# Patient Record
Sex: FEMALE | URBAN - METROPOLITAN AREA
[De-identification: names, ages, dates, MRNs, and addresses within clinical notes are randomized per-mention and may not be internally consistent; named-entity substitution may affect disease eponyms.]

---

## 2019-12-19 ENCOUNTER — APPOINTMENT (OUTPATIENT)
Age: 64
Setting detail: DERMATOLOGY
End: 2020-01-04

## 2019-12-19 PROBLEM — E86.9 VOLUME DEPLETION, UNSPECIFIED: Status: ACTIVE | Noted: 2019-12-19

## 2019-12-19 PROCEDURE — OTHER ADDITIONAL NOTES: OTHER

## 2019-12-19 PROCEDURE — OTHER JUVEDERM VOLUMA XC INJECTION: OTHER

## 2019-12-19 NOTE — PROCEDURE: JUVEDERM VOLUMA XC INJECTION
Additional Area 5 Volume In Cc: 0
Detail Level: Simple
Consent: Written consent obtained. Risks include but not limited to bruising, beading, irregular texture, ulceration, infection, allergic reaction, scar formation, incomplete augmentation, temporary nature, procedural pain.
Procedural Text: The filler was administered to the treatment areas noted above with total 2 syringes of Voluma XC
Include Cannula Size?: 25G
Post-Care Instructions: Patient instructed to apply ice to reduce swelling.
Include Cannula Information In Note?: Yes
Number Of Syringes (Required For Inventory): 2
Use Map Statement For Sites (Optional): No
Filler: Juvederm Voluma XC
Price (Use Numbers Only, No Special Characters Or $): 720.0
Include Cannula Length?: 1 inch
Map Statment: See Attach Map for Complete Details

## 2020-02-18 ENCOUNTER — APPOINTMENT (OUTPATIENT)
Age: 65
Setting detail: DERMATOLOGY
End: 2020-02-20

## 2020-02-18 DIAGNOSIS — L81.4 OTHER MELANIN HYPERPIGMENTATION: ICD-10-CM

## 2020-02-18 DIAGNOSIS — I83.9 ASYMPTOMATIC VARICOSE VEINS OF LOWER EXTREMITIES: ICD-10-CM

## 2020-02-18 PROBLEM — I83.93 ASYMPTOMATIC VARICOSE VEINS OF BILATERAL LOWER EXTREMITIES: Status: ACTIVE | Noted: 2020-02-18

## 2020-02-18 PROCEDURE — OTHER LASER COSMETIC: OTHER

## 2020-02-18 PROCEDURE — OTHER PRODUCT LINE (REVISION): OTHER

## 2020-02-18 ASSESSMENT — LOCATION SIMPLE DESCRIPTION DERM
LOCATION SIMPLE: LEFT CHEEK
LOCATION SIMPLE: LEFT LIP
LOCATION SIMPLE: NOSE
LOCATION SIMPLE: RIGHT CHEEK
LOCATION SIMPLE: SUPERIOR FOREHEAD
LOCATION SIMPLE: RIGHT LIP
LOCATION SIMPLE: CHIN

## 2020-02-18 ASSESSMENT — LOCATION DETAILED DESCRIPTION DERM
LOCATION DETAILED: LEFT UPPER CUTANEOUS LIP
LOCATION DETAILED: NASAL DORSUM
LOCATION DETAILED: RIGHT LATERAL BUCCAL CHEEK
LOCATION DETAILED: RIGHT INFERIOR CENTRAL MALAR CHEEK
LOCATION DETAILED: SUPERIOR MID FOREHEAD
LOCATION DETAILED: LEFT CHIN
LOCATION DETAILED: LEFT CENTRAL MALAR CHEEK
LOCATION DETAILED: LEFT INFERIOR CENTRAL MALAR CHEEK
LOCATION DETAILED: RIGHT UPPER CUTANEOUS LIP

## 2020-02-18 ASSESSMENT — LOCATION ZONE DERM
LOCATION ZONE: LIP
LOCATION ZONE: NOSE
LOCATION ZONE: FACE

## 2020-02-18 NOTE — PROCEDURE: LASER COSMETIC
Detail Level: Simple
Pulse Duration (Include Units): 40 msec.
Indication: Rosacea
Wavelength (Include Units): 595
Render Post-Care In The Note: No
Treatment Number: 1/2
Number Of Passes: 2
Pre-Procedure Care: Prior to the procedure the patient and all present had protective eyewear in place and a warning sign was placed on the door.
End-Point And Post-Procedure Care: The procedure continued until mild purpura was noted.  Immediately following the procedure,TAC cream applied. Post care reviewed with patient.
Spotsize (Include Units): 12 mm
Post-Care Instructions: I reviewed with the patient in detail post-care instructions. Patient should stay away from the sun and wear sun protection until treated areas are fully healed.
Laser Type: Vbeam
Price (Use Numbers Only, No Special Characters Or $): 525.0
Anesthesia Volume In Cc: 0
Eye Protection: Laser Aid
Fluence (Include Units): 7
Consent: Prior to the procedure consent obtained, risks reviewed including but not limited to crusting, scabbing, blistering, scarring, darker or lighter pigmentary change, incidental hair removal, bruising, and/or incomplete removal.

## 2020-02-18 NOTE — PROCEDURE: PRODUCT LINE (REVISION)
Product 10 Units: 0
Product 21 Price (In Dollars - Numeric Only, No Special Characters Or $): 0.00
Name Of Product 2: Intellishade Matte
Render Product Pricing In Note: Yes
Allow Plan To Count Towards E/M Coding: No (this will remove associated impression from E/M calculation)
Product 2 Price (In Dollars - Numeric Only, No Special Characters Or $): 65.85
Send Charges To Patient Encounter: No
Detail Level: Zone
Name Of Product 1: Vit C++
Name Of Product 3: Nectifirm
Product 1 Price (In Dollars - Numeric Only, No Special Characters Or $): 140.48

## 2020-03-12 ENCOUNTER — APPOINTMENT (OUTPATIENT)
Age: 65
Setting detail: DERMATOLOGY
End: 2020-03-17

## 2020-03-12 DIAGNOSIS — L90.8 OTHER ATROPHIC DISORDERS OF SKIN: ICD-10-CM

## 2020-03-12 PROCEDURE — OTHER BOTOX: OTHER

## 2020-03-12 PROCEDURE — OTHER ADDITIONAL NOTES: OTHER

## 2020-03-12 ASSESSMENT — LOCATION DETAILED DESCRIPTION DERM
LOCATION DETAILED: LEFT INFERIOR TEMPLE
LOCATION DETAILED: RIGHT MID TEMPLE
LOCATION DETAILED: RIGHT INFERIOR FOREHEAD
LOCATION DETAILED: GLABELLA
LOCATION DETAILED: LEFT LATERAL FOREHEAD
LOCATION DETAILED: RIGHT INFERIOR MEDIAL FOREHEAD
LOCATION DETAILED: LEFT INFERIOR MEDIAL FOREHEAD
LOCATION DETAILED: RIGHT LATERAL EYEBROW

## 2020-03-12 ASSESSMENT — LOCATION SIMPLE DESCRIPTION DERM
LOCATION SIMPLE: LEFT TEMPLE
LOCATION SIMPLE: LEFT FOREHEAD
LOCATION SIMPLE: GLABELLA
LOCATION SIMPLE: RIGHT EYEBROW
LOCATION SIMPLE: RIGHT TEMPLE
LOCATION SIMPLE: RIGHT FOREHEAD

## 2020-03-12 ASSESSMENT — LOCATION ZONE DERM: LOCATION ZONE: FACE

## 2020-03-12 NOTE — PROCEDURE: BOTOX
Consent: Written consent obtained. Risks include but not limited to lid/brow ptosis, bruising, swelling, diplopia, temporary effect, incomplete chemical denervation. Psych/Behavioral

## 2021-03-31 ENCOUNTER — APPOINTMENT (OUTPATIENT)
Dept: URBAN - METROPOLITAN AREA CLINIC 272 | Age: 66
Setting detail: DERMATOLOGY
End: 2021-03-31

## 2021-03-31 DIAGNOSIS — L90.8 OTHER ATROPHIC DISORDERS OF SKIN: ICD-10-CM

## 2021-03-31 PROCEDURE — OTHER ADDITIONAL NOTES: OTHER

## 2021-03-31 PROCEDURE — OTHER BOTOX: OTHER

## 2021-03-31 ASSESSMENT — LOCATION DETAILED DESCRIPTION DERM
LOCATION DETAILED: LEFT MID TEMPLE
LOCATION DETAILED: LEFT CENTRAL EYEBROW
LOCATION DETAILED: RIGHT CENTRAL EYEBROW
LOCATION DETAILED: GLABELLA
LOCATION DETAILED: RIGHT MEDIAL EYEBROW
LOCATION DETAILED: RIGHT MID TEMPLE
LOCATION DETAILED: RIGHT LATERAL EYEBROW

## 2021-03-31 ASSESSMENT — LOCATION SIMPLE DESCRIPTION DERM
LOCATION SIMPLE: RIGHT EYEBROW
LOCATION SIMPLE: GLABELLA
LOCATION SIMPLE: RIGHT TEMPLE
LOCATION SIMPLE: LEFT EYEBROW
LOCATION SIMPLE: LEFT TEMPLE

## 2021-03-31 ASSESSMENT — LOCATION ZONE DERM: LOCATION ZONE: FACE

## 2021-03-31 NOTE — PROCEDURE: ADDITIONAL NOTES
Render Risk Assessment In Note?: no
Additional Notes: 6 units on right periorbital 4 on left side
Detail Level: Simple

## 2021-04-14 ENCOUNTER — APPOINTMENT (OUTPATIENT)
Dept: URBAN - METROPOLITAN AREA CLINIC 272 | Age: 66
Setting detail: DERMATOLOGY
End: 2021-04-14

## 2021-04-14 DIAGNOSIS — Z41.9 ENCOUNTER FOR PROCEDURE FOR PURPOSES OTHER THAN REMEDYING HEALTH STATE, UNSPECIFIED: ICD-10-CM

## 2021-04-14 PROCEDURE — OTHER FILLERS: OTHER

## 2021-04-14 PROCEDURE — OTHER ADDITIONAL NOTES: OTHER

## 2021-04-14 NOTE — PROCEDURE: FILLERS
Price (Use Numbers Only, No Special Characters Or $): 6529 Price (Use Numbers Only, No Special Characters Or $): 2870

## 2021-04-14 NOTE — PROCEDURE: ADDITIONAL NOTES
Additional Notes: Patient had 3 visible bruises, all zafar-able with brisk capillary refill.  Patient given ice and happy with results.  Bruising was expected,  pt made aware of signs and symptoms of occlusion and to notify office or provider if any occur.
Render Risk Assessment In Note?: no
Detail Level: Simple

## 2024-02-15 ENCOUNTER — APPOINTMENT (OUTPATIENT)
Dept: URBAN - METROPOLITAN AREA CLINIC 305 | Age: 69
Setting detail: DERMATOLOGY
End: 2024-02-15

## 2024-02-15 DIAGNOSIS — Z41.9 ENCOUNTER FOR PROCEDURE FOR PURPOSES OTHER THAN REMEDYING HEALTH STATE, UNSPECIFIED: ICD-10-CM

## 2024-02-15 PROCEDURE — OTHER LASER COSMETIC: OTHER

## 2024-02-15 PROCEDURE — OTHER PRESCRIPTION MEDICATION MANAGEMENT: OTHER

## 2024-02-15 PROCEDURE — OTHER ADDITIONAL NOTES: OTHER

## 2024-02-15 ASSESSMENT — LOCATION DETAILED DESCRIPTION DERM
LOCATION DETAILED: UPPER STERNUM
LOCATION DETAILED: RIGHT INFERIOR CENTRAL MALAR CHEEK
LOCATION DETAILED: RIGHT MEDIAL FOREHEAD

## 2024-02-15 ASSESSMENT — LOCATION SIMPLE DESCRIPTION DERM
LOCATION SIMPLE: RIGHT FOREHEAD
LOCATION SIMPLE: RIGHT CHEEK
LOCATION SIMPLE: CHEST

## 2024-02-15 ASSESSMENT — LOCATION ZONE DERM
LOCATION ZONE: FACE
LOCATION ZONE: TRUNK

## 2024-02-15 NOTE — PROCEDURE: PRESCRIPTION MEDICATION MANAGEMENT
Render In Strict Bullet Format?: No
Detail Level: Zone
Initiate Treatment: Patient to start keflex 500 mg po bid x5 days. Took valium 10 mg po one hour before procedure.  present to drive. Also called in Prednisone taper pack to take if swelling becomes severe. 40 mg day one, 30 mg day two, 20 mg day three, 10 mg day 4. Called in per BONG Gray NP. Patient denies need for Valtrex.

## 2024-02-15 NOTE — PROCEDURE: LASER COSMETIC
Beam Overlap/Density (Include Units): 400 sp
Wavelength (Include Units): 25 w
Laser Type: affirm co2 laser
Treatment Number: 1
Post-Care Instructions: I reviewed with the patient in detail post-care instructions and gave copy to patient. Patient given Elta laser balm to use for the first 24 hours. Patient should stay away from the sun and wear sun protection until treated areas are fully healed.
Render Post-Care In The Note: No
End-Point And Post-Procedure Care: Gentle skin care required until skin healed. No sun exposure for one week. Do not pick at area. Post care reviewed with patient.
Pulse Duration (Include Units): 1400 dw
Indication: texture
Eye Protection: opaque eyeshield
Interval Change: first pass to forehead, cheeks, and chin
Detail Level: Zone
Consent: Prior to the procedure consent obtained, risks reviewed including but not limited to crusting, scabbing, blistering, scarring, darker or lighter pigmentary change, incidental hair removal, bruising, and/or incomplete removal.
Pre-Procedure Care: Prior to the procedure the patient and all present had protective eyewear in place and a warning sign was placed on the door.
Anesthesia Volume In Cc: 0
Anesthesia Type: 1% lidocaine with epinephrine
Price (Use Numbers Only, No Special Characters Or $): 450
Wavelength (Include Units): 20 w
Beam Overlap/Density (Include Units): 500 sp
Pulse Duration (Include Units): 800 dw
Interval Change: 2nd pass to face, two passes to eyes and neck
Wavelength (Include Units): 13 w
Indication: feathering to neck and chest
Beam Overlap/Density (Include Units): 800 sp
Pulse Duration (Include Units): 400 dw

## 2024-02-15 NOTE — PROCEDURE: ADDITIONAL NOTES
Additional Notes: Tolerated well. Requested a very strong treatment has had at least three CO2‘s in the past. Had prepurchased face but decided to add neck today. 40% discount given
Render Risk Assessment In Note?: no
Detail Level: Simple

## 2024-03-15 ENCOUNTER — APPOINTMENT (OUTPATIENT)
Dept: URBAN - METROPOLITAN AREA CLINIC 305 | Age: 69
Setting detail: DERMATOLOGY
End: 2024-03-15

## 2024-03-15 DIAGNOSIS — Z41.9 ENCOUNTER FOR PROCEDURE FOR PURPOSES OTHER THAN REMEDYING HEALTH STATE, UNSPECIFIED: ICD-10-CM

## 2024-03-15 DIAGNOSIS — L90.8 OTHER ATROPHIC DISORDERS OF SKIN: ICD-10-CM

## 2024-03-15 PROCEDURE — OTHER ADDITIONAL NOTES: OTHER

## 2024-03-15 PROCEDURE — OTHER DAXXIFY: OTHER

## 2024-03-15 PROCEDURE — OTHER COSMETIC FOLLOW-UP: OTHER

## 2024-03-15 ASSESSMENT — LOCATION DETAILED DESCRIPTION DERM: LOCATION DETAILED: LEFT CENTRAL OCCIPITAL SCALP

## 2024-03-15 ASSESSMENT — LOCATION SIMPLE DESCRIPTION DERM: LOCATION SIMPLE: SCALP

## 2024-03-15 ASSESSMENT — LOCATION ZONE DERM: LOCATION ZONE: SCALP

## 2024-03-15 NOTE — PROCEDURE: DAXXIFY
Show Levator Superior Units: Yes
L Brow Units: 0
Depressor Anguli Oris Units: 16
Show Right And Left Pupillary Line Units: No
Additional Area 2 Location: [facility]
Periorbital Skin Units: 42
Detail Level: Detailed
Additional Area 1 Location: orbicularis oris
Consent: Written consent obtained. Risks include but not limited to lid/brow ptosis, bruising, swelling, diplopia, temporary effect, incomplete chemical denervation.
Glabellar Complex Units: 40
Bill Summary Price Listed Below, Or Bill Total Of Units X Price Per Unit?: Bill Summary Price Below
Post-Care Instructions: Patient instructed to not lie down for 4 hours and limit physical activity for 24 hours.
Show Inventory Tab: Show
Dilution (U/0.1 Cc): 8

## 2024-03-15 NOTE — PROCEDURE: ADDITIONAL NOTES
Render Risk Assessment In Note?: no
Additional Notes: Says she stopped Botox for awhile since it seemed to not last as long anymore
Detail Level: Simple

## 2024-03-15 NOTE — PROCEDURE: COSMETIC FOLLOW-UP
Comments (Free Text): Patient given rozatrol sample to use bid for relief of redness especially to left cheek in the form of three stripes. Encouraged to let me know progress, and if no relief return to clinic for VBeam treatment
Treatment (Optional): Laser Resurfacing
Patient Satisfaction: Pleased
Side Effects Or Complications: None
Price (Use Numbers Only, No Special Characters Or $): 0
Detail Level: Zone
Side Effects Override (Free Text): redness to left cheek (striping)
Global Improvement: Good

## 2025-02-04 ENCOUNTER — RX ONLY (RX ONLY)
Age: 70
End: 2025-02-04

## 2025-02-04 RX ORDER — CEPHALEXIN 500 MG/1
1 CAPSULE CAPSULE ORAL TWICE DAILY
Qty: 10 | Refills: 0 | Status: ERX | COMMUNITY
Start: 2025-02-04

## 2025-02-04 RX ORDER — PREDNISONE 10 MG/1
TABLET ORAL
Qty: 10 | Refills: 0 | Status: ERX | COMMUNITY
Start: 2025-02-04

## 2025-02-05 ENCOUNTER — APPOINTMENT (OUTPATIENT)
Dept: URBAN - METROPOLITAN AREA CLINIC 305 | Age: 70
Setting detail: DERMATOLOGY
End: 2025-02-05

## 2025-02-05 DIAGNOSIS — Z41.9 ENCOUNTER FOR PROCEDURE FOR PURPOSES OTHER THAN REMEDYING HEALTH STATE, UNSPECIFIED: ICD-10-CM

## 2025-02-05 PROCEDURE — OTHER LASER COSMETIC: OTHER

## 2025-02-05 PROCEDURE — OTHER ADDITIONAL NOTES: OTHER

## 2025-02-05 PROCEDURE — OTHER PRESCRIPTION MEDICATION MANAGEMENT: OTHER

## 2025-02-05 ASSESSMENT — LOCATION DETAILED DESCRIPTION DERM
LOCATION DETAILED: RIGHT CENTRAL EYEBROW
LOCATION DETAILED: LEFT CENTRAL EYEBROW

## 2025-02-05 ASSESSMENT — LOCATION SIMPLE DESCRIPTION DERM
LOCATION SIMPLE: LEFT EYEBROW
LOCATION SIMPLE: RIGHT EYEBROW

## 2025-02-05 ASSESSMENT — LOCATION ZONE DERM: LOCATION ZONE: FACE

## 2025-02-05 NOTE — PROCEDURE: PRESCRIPTION MEDICATION MANAGEMENT
Render In Strict Bullet Format?: No
Detail Level: Zone
Initiate Treatment: Patient to start keflex 500 mg po bid x5 days. Also called in Prednisone taper pack to take if swelling becomes severe. 40 mg day one, 30 mg day two, 20 mg day three, 10 mg day 4. Called in per BONG Gray NP. Patient denies need for Valtrex.

## 2025-02-05 NOTE — PROCEDURE: ADDITIONAL NOTES
Detail Level: Simple
Additional Notes: 2/5/24 patient tolerated well. Will try to do two more this winter.
Render Risk Assessment In Note?: no

## 2025-02-05 NOTE — PROCEDURE: LASER COSMETIC
Indication: texture
Post-Care Instructions: I reviewed with the patient in detail post-care instructions and gave copy to patient. Patient given Elta laser balm to use for the first 24 hours. Patient should stay away from the sun and wear sun protection until treated areas are fully healed.
Render Post-Care In The Note: No
Detail Level: Zone
End-Point And Post-Procedure Care: Gentle skin care required until skin healed. No sun exposure for one week. Do not pick at area. Post care reviewed with patient and a copy of instructions given to patient. Elta Laser [place] applied and remainder given to patient to use.
Eye Protection: opaque eyeshield
Consent: Prior to the procedure consent obtained, risks reviewed including but not limited to crusting, scabbing, blistering, scarring, darker or lighter pigmentary change, incidental hair removal, bruising, and/or incomplete removal.
Pre-Procedure Care: Prior to the procedure the patient and all present had protective eyewear in place and a warning sign was placed on the door.
Anesthesia Volume In Cc: 0
Anesthesia Type: 1% lidocaine with epinephrine
Wavelength (Include Units): 21 w
Treatment Number: 1
Laser Type: affirm co2 laser
# Of Treatments In Package: 3
Beam Overlap/Density (Include Units): 500 sp
Pulse Duration (Include Units): 800 dw
Interval Change: two passes to eyes

## 2025-03-04 ENCOUNTER — RX ONLY (RX ONLY)
Age: 70
End: 2025-03-04

## 2025-03-04 RX ORDER — CEPHALEXIN 500 MG/1
1 CAPSULE CAPSULE ORAL TWICE DAILY
Qty: 10 | Refills: 0 | Status: ERX | COMMUNITY
Start: 2025-03-04

## 2025-03-04 RX ORDER — PREDNISONE 10 MG/1
TABLET ORAL
Qty: 10 | Refills: 0 | Status: ERX | COMMUNITY
Start: 2025-03-04

## 2025-03-07 ENCOUNTER — APPOINTMENT (OUTPATIENT)
Dept: URBAN - METROPOLITAN AREA CLINIC 305 | Age: 70
Setting detail: DERMATOLOGY
End: 2025-03-07

## 2025-03-07 DIAGNOSIS — Z41.9 ENCOUNTER FOR PROCEDURE FOR PURPOSES OTHER THAN REMEDYING HEALTH STATE, UNSPECIFIED: ICD-10-CM

## 2025-03-07 PROCEDURE — OTHER LASER COSMETIC: OTHER

## 2025-03-07 PROCEDURE — OTHER PRESCRIPTION MEDICATION MANAGEMENT: OTHER

## 2025-03-07 PROCEDURE — OTHER ADDITIONAL NOTES: OTHER

## 2025-03-07 ASSESSMENT — LOCATION ZONE DERM: LOCATION ZONE: FACE

## 2025-03-07 ASSESSMENT — LOCATION SIMPLE DESCRIPTION DERM
LOCATION SIMPLE: RIGHT EYEBROW
LOCATION SIMPLE: LEFT EYEBROW

## 2025-03-07 ASSESSMENT — LOCATION DETAILED DESCRIPTION DERM
LOCATION DETAILED: RIGHT CENTRAL EYEBROW
LOCATION DETAILED: LEFT CENTRAL EYEBROW

## 2025-03-07 NOTE — PROCEDURE: PRESCRIPTION MEDICATION MANAGEMENT
Render In Strict Bullet Format?: No
Detail Level: Zone
Initiate Treatment: Patient to start keflex 500 mg po bid x5 days. Also called in Prednisone taper pack to take if swelling becomes severe. 40 mg day one, 30 mg day two, 20 mg day three, 10 mg day 4. Called in per BONG Gray NP. Patient denies need for Valtrex. Gave a small amount of Triamcinalone 0.1% to use in 4 days if healing isn’t as fast as she desires. Patient understands this may take some of the benefit of the laser away.

## 2025-03-07 NOTE — PROCEDURE: LASER COSMETIC
Indication: texture
Post-Care Instructions: I reviewed with the patient in detail post-care instructions and gave copy to patient. Patient given Elta laser balm to use for the first 24 hours. Patient should stay away from the sun and wear sun protection until treated areas are fully healed.
Render Post-Care In The Note: No
Detail Level: Zone
End-Point And Post-Procedure Care: Gentle skin care required until skin healed. No sun exposure for one week. Do not pick at area. Post care reviewed with patient and a copy of instructions given to patient. Elta Laser [place] applied and remainder given to patient to use.
Eye Protection: opaque eyeshield
Consent: Prior to the procedure consent obtained, risks reviewed including but not limited to crusting, scabbing, blistering, scarring, darker or lighter pigmentary change, incidental hair removal, bruising, and/or incomplete removal.
Pre-Procedure Care: Prior to the procedure the patient and all present had protective eyewear in place and a warning sign was placed on the door.
Anesthesia Volume In Cc: 0
Anesthesia Type: 1% lidocaine with epinephrine
Wavelength (Include Units): 21 w
Treatment Number: 2
Laser Type: affirm co2 laser
# Of Treatments In Package: 3
Beam Overlap/Density (Include Units): 500 sp
Pulse Duration (Include Units): 800 dw
Interval Change: two passes to eyelids and nose bridge

## 2025-03-07 NOTE — PROCEDURE: ADDITIONAL NOTES
Detail Level: Simple
Additional Notes: 2/5/24 patient tolerated well. Will try to do one more this winter but it may need to be next winter.
Render Risk Assessment In Note?: no